# Patient Record
Sex: FEMALE | Race: WHITE | NOT HISPANIC OR LATINO | Employment: UNEMPLOYED | URBAN - NONMETROPOLITAN AREA
[De-identification: names, ages, dates, MRNs, and addresses within clinical notes are randomized per-mention and may not be internally consistent; named-entity substitution may affect disease eponyms.]

---

## 2022-12-20 ENCOUNTER — OFFICE VISIT (OUTPATIENT)
Dept: FAMILY MEDICINE CLINIC | Age: 32
End: 2022-12-20

## 2022-12-20 VITALS
WEIGHT: 194.6 LBS | TEMPERATURE: 98 F | BODY MASS INDEX: 30.54 KG/M2 | OXYGEN SATURATION: 99 % | DIASTOLIC BLOOD PRESSURE: 71 MMHG | HEART RATE: 63 BPM | HEIGHT: 67 IN | SYSTOLIC BLOOD PRESSURE: 126 MMHG

## 2022-12-20 DIAGNOSIS — E66.09 CLASS 1 OBESITY DUE TO EXCESS CALORIES WITHOUT SERIOUS COMORBIDITY WITH BODY MASS INDEX (BMI) OF 30.0 TO 30.9 IN ADULT: Primary | ICD-10-CM

## 2022-12-20 DIAGNOSIS — Z13.6 SCREENING FOR CARDIOVASCULAR CONDITION: ICD-10-CM

## 2022-12-20 DIAGNOSIS — F41.9 ANXIETY: ICD-10-CM

## 2022-12-20 PROBLEM — O42.90: Status: ACTIVE | Noted: 2017-09-22

## 2022-12-20 PROBLEM — B19.20 HEPATITIS C: Status: ACTIVE | Noted: 2017-09-22

## 2022-12-20 PROBLEM — Z34.90 PREGNANCY: Status: ACTIVE | Noted: 2017-09-21

## 2022-12-20 PROCEDURE — 99204 OFFICE O/P NEW MOD 45 MIN: CPT | Performed by: NURSE PRACTITIONER

## 2022-12-20 RX ORDER — VITAMIN A ACETATE, BETA CAROTENE, ASCORBIC ACID, CHOLECALCIFEROL, .ALPHA.-TOCOPHEROL ACETATE, DL-, THIAMINE MONONITRATE, RIBOFLAVIN, NIACINAMIDE, PYRIDOXINE HYDROCHLORIDE, FOLIC ACID, CYANOCOBALAMIN, CALCIUM CARBONATE, FERROUS FUMARATE, ZINC OXIDE, CUPRIC OXIDE 3080; 12; 120; 400; 1; 1.84; 3; 20; 22; 920; 25; 200; 27; 10; 2 [IU]/1; UG/1; MG/1; [IU]/1; MG/1; MG/1; MG/1; MG/1; MG/1; [IU]/1; MG/1; MG/1; MG/1; MG/1; MG/1
1 TABLET, FILM COATED ORAL DAILY
COMMUNITY

## 2022-12-20 RX ORDER — VENLAFAXINE HYDROCHLORIDE 37.5 MG/1
37.5 CAPSULE, EXTENDED RELEASE ORAL DAILY
Qty: 30 CAPSULE | Refills: 1 | Status: SHIPPED | OUTPATIENT
Start: 2022-12-20 | End: 2023-02-28 | Stop reason: SDUPTHER
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
## 2022-12-30 ENCOUNTER — TELEPHONE (OUTPATIENT)
Dept: FAMILY MEDICINE CLINIC | Age: 32
End: 2022-12-30

## 2023-02-28 DIAGNOSIS — F41.9 ANXIETY: ICD-10-CM

## 2023-02-28 RX ORDER — VENLAFAXINE HYDROCHLORIDE 37.5 MG/1
37.5 CAPSULE, EXTENDED RELEASE ORAL DAILY
Qty: 30 CAPSULE | Refills: 0 | Status: SHIPPED | OUTPATIENT
Start: 2023-02-28

## 2023-10-09 ENCOUNTER — OFFICE VISIT (OUTPATIENT)
Dept: FAMILY MEDICINE CLINIC | Age: 33
End: 2023-10-09
Payer: MEDICAID

## 2023-10-09 VITALS
HEART RATE: 63 BPM | OXYGEN SATURATION: 100 % | SYSTOLIC BLOOD PRESSURE: 122 MMHG | DIASTOLIC BLOOD PRESSURE: 64 MMHG | WEIGHT: 194 LBS | BODY MASS INDEX: 30.45 KG/M2 | TEMPERATURE: 98.3 F | HEIGHT: 67 IN

## 2023-10-09 DIAGNOSIS — N92.6 ABNORMAL MENSTRUAL CYCLE: Primary | ICD-10-CM

## 2023-10-09 PROCEDURE — 99213 OFFICE O/P EST LOW 20 MIN: CPT | Performed by: NURSE PRACTITIONER

## 2023-10-09 PROCEDURE — 1159F MED LIST DOCD IN RCRD: CPT | Performed by: NURSE PRACTITIONER

## 2023-10-09 PROCEDURE — 1160F RVW MEDS BY RX/DR IN RCRD: CPT | Performed by: NURSE PRACTITIONER

## 2023-10-09 NOTE — PROGRESS NOTES
"Chief Complaint  Marleen Eckert presents to [facility] for Follow-up (Positive pregnancy test, had some bleeding during intercourse had a miscarriage in may. not bleeding anymore. Jordan has a positive pregnancy test. )      Subjective     History of Present Illness    Marleen is here for concerns over recent vaginal bleeding. She has been actively trying to conceive.  She has been pregnant 5 times-1 miscarriage and 4 live births delivered at around 34 weeks.  1 placental abruption.  No pain reported today.  Her last normal menstrual period was August 20- around Sept. 30, started having heavy- abnormal bleeding  at this time she did have a positive urine pregnancy test.  Some intermittent bleeding in between with intercourse and today positive pregnancy test .  She does not have nausea, she does have breast tenderness.  Last pap 2017 normal. No gynecologist but her previous with Zina Herrera.      Assessment and Plan       Diagnoses and all orders for this visit:    1. Abnormal menstrual cycle (Primary)  Comments:  we will check labs- refer to Gyn- Javier for abnormal menstrual bleeding;  defer US to GYN  Orders:  -     hCG, Serum, Qualitative; Future  -     hCG, Quantitative, Pregnancy; Future  -     Ambulatory Referral to Gynecology            Follow Up   Return for Pending lab results.      No orders of the defined types were placed in this encounter.      There are no discontinued medications.       Review of Systems    Objective     Vitals:    10/09/23 0819   BP: 122/64   BP Location: Right arm   Patient Position: Sitting   Pulse: 63   Temp: 98.3 øF (36.8 øC)   TempSrc: Oral   SpO2: 100%   Weight: 88 kg (194 lb)   Height: 170.2 cm (67.01\")     Body mass index is 30.38 kg/mý.          Physical Exam  Constitutional:       General: She is not in acute distress.     Appearance: Normal appearance.   HENT:      Head: Normocephalic.   Cardiovascular:      Rate and Rhythm: Normal rate and " regular rhythm.   Pulmonary:      Effort: Pulmonary effort is normal.      Breath sounds: Normal breath sounds.   Musculoskeletal:         General: Normal range of motion.   Neurological:      General: No focal deficit present.      Mental Status: She is alert and oriented to person, place, and time.   Psychiatric:         Mood and Affect: Mood normal.         Behavior: Behavior normal.            Result Review               No Known Allergies   Past Medical History:   Diagnosis Date    Obesity 2011    Struggled with my weight my whole life     Current Outpatient Medications   Medication Sig Dispense Refill    prenatal vitamins (PRENATAL 27-1) 27-1 MG tablet tablet Take 1 tablet by mouth Daily.      venlafaxine XR (Effexor XR) 37.5 MG 24 hr capsule Take 1 capsule by mouth Daily. (Patient not taking: Reported on 10/9/2023) 30 capsule 0     No current facility-administered medications for this visit.     Past Surgical History:   Procedure Laterality Date    CHOLECYSTECTOMY  March 2019      Health Maintenance Due   Topic Date Due    Hepatitis B (1 of 3 - 3-dose series) Never done    COVID-19 Vaccine (1) Never done    Pneumococcal Vaccine 0-64 (1 - PCV) Never done    ANNUAL PHYSICAL  Never done    PAP SMEAR  12/20/2022    INFLUENZA VACCINE  Never done      Immunization History   Administered Date(s) Administered    MMR 09/27/2017    Tdap 08/16/2017         Part of this note may be an electronic transcription/translation of spoken language to printed   text using the Dragon Dictation System.      RONNY López

## 2023-11-13 ENCOUNTER — TELEPHONE (OUTPATIENT)
Dept: FAMILY MEDICINE CLINIC | Age: 33
End: 2023-11-13
Payer: MEDICAID

## 2023-11-13 NOTE — TELEPHONE ENCOUNTER
----- Message from Monica Allen RN sent at 11/13/2023 11:24 AM EST -----      ----- Message -----  From: SYSTEM  Sent: 11/13/2023   1:52 AM EST  To: [facility] [place] Lenard Kumar [facility]

## 2025-07-08 ENCOUNTER — TELEPHONE (OUTPATIENT)
Dept: FAMILY MEDICINE CLINIC | Age: 35
End: 2025-07-08
Payer: MEDICAID

## 2025-07-08 NOTE — TELEPHONE ENCOUNTER
*HUB TO RELAY* JACOB VELA WILL NO LONGER BE IN OFFICE AFTER 09/05/25. Called pt to establish care with another provider. No answer, left VM.